# Patient Record
Sex: MALE | Race: WHITE | NOT HISPANIC OR LATINO | Employment: OTHER | URBAN - METROPOLITAN AREA
[De-identification: names, ages, dates, MRNs, and addresses within clinical notes are randomized per-mention and may not be internally consistent; named-entity substitution may affect disease eponyms.]

---

## 2017-05-03 ENCOUNTER — IMPORTED ENCOUNTER (OUTPATIENT)
Dept: URBAN - METROPOLITAN AREA CLINIC 31 | Facility: CLINIC | Age: 77
End: 2017-05-03

## 2017-05-03 PROBLEM — H26.492: Noted: 2017-05-03

## 2017-05-03 PROBLEM — H26.491: Noted: 2017-05-03

## 2017-05-03 PROBLEM — Z96.1: Noted: 2017-05-03

## 2017-05-03 PROCEDURE — 92014 COMPRE OPH EXAM EST PT 1/>: CPT

## 2017-05-31 ENCOUNTER — IMPORTED ENCOUNTER (OUTPATIENT)
Dept: URBAN - METROPOLITAN AREA CLINIC 31 | Facility: CLINIC | Age: 77
End: 2017-05-31

## 2017-05-31 PROBLEM — Z96.1: Noted: 2017-05-31

## 2017-05-31 PROCEDURE — 99024 POSTOP FOLLOW-UP VISIT: CPT

## 2017-11-29 ENCOUNTER — IMPORTED ENCOUNTER (OUTPATIENT)
Dept: URBAN - METROPOLITAN AREA CLINIC 31 | Facility: CLINIC | Age: 77
End: 2017-11-29

## 2017-11-29 PROBLEM — H26.491: Noted: 2017-11-29

## 2017-11-29 PROBLEM — Z96.1: Noted: 2017-11-29

## 2017-11-29 PROCEDURE — 92014 COMPRE OPH EXAM EST PT 1/>: CPT

## 2017-11-29 NOTE — PATIENT DISCUSSION
(H40.013) Open angle with borderline findings, low risk, bilateral - Assesment : Examination revealed mild suspicion for Open Angle Glaucoma. OCT [facility] [place] today. - Plan : Monitor for IOP and NFL changes with visits and testing. RTC in 1 year for Exam, Mac OCT, and OCT ONH, sooner if problems or changes.

## 2017-11-29 NOTE — PATIENT DISCUSSION
(H53.2) Diplopia - Assesment : Pt complains of intermittent diplopia. Diplopia not present at exam today. - Plan : Pt to call with increased diplopia or concerns.

## 2017-11-29 NOTE — PATIENT DISCUSSION
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 Mac OCT today. - Plan : Monitor for macular changes with visits and testing. Advised patient to call our office with decreased vision or increased symptoms. RTC in 1 year for Exam, Mac OCT, and OCT ONH, sooner if problems or changes.

## 2017-11-29 NOTE — PATIENT DISCUSSION
(H25.12) Age-related nuclear cataract, left eye - Assesment : Examination revealed cataract. Pt is bothered and symptomatic. - Plan : Monitor for changes. Advised patient of condition of cataract OS and option of CE OS to improve visual function OS. Pt prefers to wait at this time. Pt to call our office with decreased vision, increased symptoms, or decides would like to proceed with extraction before next visit.

## 2018-01-05 ENCOUNTER — IMPORTED ENCOUNTER (OUTPATIENT)
Dept: URBAN - METROPOLITAN AREA CLINIC 31 | Facility: CLINIC | Age: 78
End: 2018-01-05

## 2018-01-05 PROBLEM — Z98.89: Noted: 2018-01-05

## 2018-01-05 PROCEDURE — 99024 POSTOP FOLLOW-UP VISIT: CPT

## 2019-11-05 ENCOUNTER — NEW PATIENT (OUTPATIENT)
Dept: URBAN - METROPOLITAN AREA CLINIC 26 | Facility: CLINIC | Age: 79
End: 2019-11-05

## 2019-11-05 VITALS
SYSTOLIC BLOOD PRESSURE: 153 MMHG | DIASTOLIC BLOOD PRESSURE: 93 MMHG | BODY MASS INDEX: 33.75 KG/M2 | HEIGHT: 66 IN | WEIGHT: 210 LBS | HEART RATE: 87 BPM

## 2019-11-05 DIAGNOSIS — D31.31: ICD-10-CM

## 2019-11-05 DIAGNOSIS — G45.3: ICD-10-CM

## 2019-11-05 DIAGNOSIS — H02.836: ICD-10-CM

## 2019-11-05 DIAGNOSIS — H02.833: ICD-10-CM

## 2019-11-05 DIAGNOSIS — H04.123: ICD-10-CM

## 2019-11-05 DIAGNOSIS — H35.3131: ICD-10-CM

## 2019-11-05 DIAGNOSIS — D31.32: ICD-10-CM

## 2019-11-05 PROCEDURE — 99204 OFFICE O/P NEW MOD 45 MIN: CPT

## 2019-11-05 PROCEDURE — 92225 OPHTHALMOSCOPY (INITIAL): CPT

## 2019-11-05 PROCEDURE — 92250 FUNDUS PHOTOGRAPHY W/I&R: CPT

## 2019-11-05 PROCEDURE — 92235 FLUORESCEIN ANGRPH MLTIFRAME: CPT

## 2019-11-05 PROCEDURE — 92134 CPTRZ OPH DX IMG PST SGM RTA: CPT

## 2019-11-05 ASSESSMENT — VISUAL ACUITY
OD_CC: 20/30-2
OD_SC: 20/40-1
OS_CC: 20/25+2
OS_SC: 20/30-2

## 2019-11-05 ASSESSMENT — TONOMETRY
OD_IOP_MMHG: 12
OS_IOP_MMHG: 11

## 2020-01-14 ENCOUNTER — FOLLOW UP (OUTPATIENT)
Dept: URBAN - METROPOLITAN AREA CLINIC 26 | Facility: CLINIC | Age: 80
End: 2020-01-14

## 2020-01-14 VITALS — BODY MASS INDEX: 32.14 KG/M2 | WEIGHT: 200 LBS | HEIGHT: 66 IN

## 2020-01-14 DIAGNOSIS — H02.833: ICD-10-CM

## 2020-01-14 DIAGNOSIS — G45.3: ICD-10-CM

## 2020-01-14 DIAGNOSIS — D31.31: ICD-10-CM

## 2020-01-14 DIAGNOSIS — H35.3131: ICD-10-CM

## 2020-01-14 DIAGNOSIS — D31.32: ICD-10-CM

## 2020-01-14 DIAGNOSIS — H02.836: ICD-10-CM

## 2020-01-14 DIAGNOSIS — H04.123: ICD-10-CM

## 2020-01-14 PROCEDURE — 92250 FUNDUS PHOTOGRAPHY W/I&R: CPT

## 2020-01-14 PROCEDURE — 92014 COMPRE OPH EXAM EST PT 1/>: CPT

## 2020-01-14 PROCEDURE — 92134 CPTRZ OPH DX IMG PST SGM RTA: CPT

## 2020-01-14 ASSESSMENT — TONOMETRY
OS_IOP_MMHG: 13
OD_IOP_MMHG: 15

## 2020-01-14 ASSESSMENT — VISUAL ACUITY
OD_CC: 20/40+1
OS_CC: 20/20

## 2020-01-30 ENCOUNTER — ADDENDUM (OUTPATIENT)
Dept: URBAN - METROPOLITAN AREA CLINIC 26 | Facility: CLINIC | Age: 80
End: 2020-01-30

## 2020-02-20 ENCOUNTER — ADDENDUM (OUTPATIENT)
Dept: URBAN - METROPOLITAN AREA CLINIC 26 | Facility: CLINIC | Age: 80
End: 2020-02-20

## 2020-09-01 ENCOUNTER — FOLLOW UP (OUTPATIENT)
Dept: URBAN - METROPOLITAN AREA CLINIC 26 | Facility: CLINIC | Age: 80
End: 2020-09-01

## 2020-09-01 DIAGNOSIS — H02.833: ICD-10-CM

## 2020-09-01 DIAGNOSIS — D31.31: ICD-10-CM

## 2020-09-01 DIAGNOSIS — H04.123: ICD-10-CM

## 2020-09-01 DIAGNOSIS — D31.32: ICD-10-CM

## 2020-09-01 DIAGNOSIS — H43.821: ICD-10-CM

## 2020-09-01 DIAGNOSIS — H02.836: ICD-10-CM

## 2020-09-01 DIAGNOSIS — H35.3131: ICD-10-CM

## 2020-09-01 DIAGNOSIS — G45.3: ICD-10-CM

## 2020-09-01 PROCEDURE — 92134 CPTRZ OPH DX IMG PST SGM RTA: CPT

## 2020-09-01 PROCEDURE — 92014 COMPRE OPH EXAM EST PT 1/>: CPT

## 2020-09-01 PROCEDURE — 92250 FUNDUS PHOTOGRAPHY W/I&R: CPT

## 2020-09-01 ASSESSMENT — VISUAL ACUITY
OS_CC: 20/25+2
OD_CC: 20/25

## 2020-09-01 ASSESSMENT — TONOMETRY
OD_IOP_MMHG: 16
OS_IOP_MMHG: 12

## 2021-04-19 ENCOUNTER — FOLLOW UP (OUTPATIENT)
Dept: URBAN - METROPOLITAN AREA CLINIC 26 | Facility: CLINIC | Age: 81
End: 2021-04-19

## 2021-04-19 VITALS — BODY MASS INDEX: 34.55 KG/M2 | WEIGHT: 215 LBS | HEIGHT: 66 IN

## 2021-04-19 DIAGNOSIS — D31.31: ICD-10-CM

## 2021-04-19 DIAGNOSIS — H43.821: ICD-10-CM

## 2021-04-19 DIAGNOSIS — G45.3: ICD-10-CM

## 2021-04-19 DIAGNOSIS — H35.3131: ICD-10-CM

## 2021-04-19 DIAGNOSIS — D31.32: ICD-10-CM

## 2021-04-19 PROCEDURE — 92134 CPTRZ OPH DX IMG PST SGM RTA: CPT

## 2021-04-19 PROCEDURE — 92014 COMPRE OPH EXAM EST PT 1/>: CPT

## 2021-04-19 PROCEDURE — 92250 FUNDUS PHOTOGRAPHY W/I&R: CPT

## 2021-04-19 ASSESSMENT — VISUAL ACUITY
OS_CC: 20/25-1
OD_CC: 20/50-2
OD_PH: 20/20-2

## 2021-04-19 ASSESSMENT — TONOMETRY
OD_IOP_MMHG: 14
OS_IOP_MMHG: 15

## 2021-08-23 ENCOUNTER — UNSCHEDULED FOLLOW UP (OUTPATIENT)
Dept: URBAN - METROPOLITAN AREA CLINIC 26 | Facility: CLINIC | Age: 81
End: 2021-08-23

## 2021-08-23 DIAGNOSIS — H04.123: ICD-10-CM

## 2021-08-23 DIAGNOSIS — D31.31: ICD-10-CM

## 2021-08-23 DIAGNOSIS — H43.821: ICD-10-CM

## 2021-08-23 DIAGNOSIS — D31.32: ICD-10-CM

## 2021-08-23 DIAGNOSIS — G45.3: ICD-10-CM

## 2021-08-23 DIAGNOSIS — H35.3131: ICD-10-CM

## 2021-08-23 DIAGNOSIS — H43.812: ICD-10-CM

## 2021-08-23 PROCEDURE — 92134 CPTRZ OPH DX IMG PST SGM RTA: CPT

## 2021-08-23 PROCEDURE — 92014 COMPRE OPH EXAM EST PT 1/>: CPT

## 2021-08-23 PROCEDURE — 92250 FUNDUS PHOTOGRAPHY W/I&R: CPT

## 2021-08-23 ASSESSMENT — VISUAL ACUITY
OS_CC: 20/30+1
OS_PH: 20/25+2
OD_CC: 20/30
OD_PH: 20/25+2

## 2021-08-23 ASSESSMENT — TONOMETRY
OD_IOP_MMHG: 15
OS_IOP_MMHG: 13

## 2021-10-04 ENCOUNTER — FOLLOW UP (OUTPATIENT)
Dept: URBAN - METROPOLITAN AREA CLINIC 26 | Facility: CLINIC | Age: 81
End: 2021-10-04

## 2021-10-04 DIAGNOSIS — G45.3: ICD-10-CM

## 2021-10-04 DIAGNOSIS — H35.3131: ICD-10-CM

## 2021-10-04 DIAGNOSIS — H43.812: ICD-10-CM

## 2021-10-04 DIAGNOSIS — H04.123: ICD-10-CM

## 2021-10-04 DIAGNOSIS — D31.31: ICD-10-CM

## 2021-10-04 DIAGNOSIS — D31.32: ICD-10-CM

## 2021-10-04 DIAGNOSIS — H43.821: ICD-10-CM

## 2021-10-04 PROCEDURE — 92134 CPTRZ OPH DX IMG PST SGM RTA: CPT

## 2021-10-04 PROCEDURE — 92014 COMPRE OPH EXAM EST PT 1/>: CPT

## 2021-10-04 PROCEDURE — 92250 FUNDUS PHOTOGRAPHY W/I&R: CPT

## 2021-10-04 ASSESSMENT — TONOMETRY
OS_IOP_MMHG: 13
OD_IOP_MMHG: 16

## 2021-10-04 ASSESSMENT — VISUAL ACUITY
OS_SC: 20/20-1
OD_SC: 20/30+1

## 2022-04-02 ASSESSMENT — VISUAL ACUITY
OU_SC: 20/40
OS_GLARE: 20/40
OU_CC: 20/20
OS_CC: 20/30-2
OS_CC: 20/25
OS_CC: 20/20
OD_GLARE: 20/40-2
OS_CC: 20/25
OD_GLARE: 20/50-1MED
OD_CC: 20/60-1
OD_CC: 20/25-1
OU_SC: 20/50
OD_CC: 20/40

## 2022-04-02 ASSESSMENT — TONOMETRY
OS_IOP_MMHG: 13
OD_IOP_MMHG: 14
OS_IOP_MMHG: 13
OS_IOP_MMHG: 14
OD_IOP_MMHG: 13

## 2022-04-06 ENCOUNTER — FOLLOW UP (OUTPATIENT)
Dept: URBAN - METROPOLITAN AREA CLINIC 26 | Facility: CLINIC | Age: 82
End: 2022-04-06

## 2022-04-06 VITALS — WEIGHT: 195 LBS | BODY MASS INDEX: 31.34 KG/M2 | HEIGHT: 66 IN

## 2022-04-06 DIAGNOSIS — H35.3131: ICD-10-CM

## 2022-04-06 DIAGNOSIS — H04.123: ICD-10-CM

## 2022-04-06 DIAGNOSIS — D31.32: ICD-10-CM

## 2022-04-06 DIAGNOSIS — H43.821: ICD-10-CM

## 2022-04-06 DIAGNOSIS — D31.31: ICD-10-CM

## 2022-04-06 DIAGNOSIS — G45.3: ICD-10-CM

## 2022-04-06 DIAGNOSIS — H43.813: ICD-10-CM

## 2022-04-06 PROCEDURE — 92014 COMPRE OPH EXAM EST PT 1/>: CPT

## 2022-04-06 PROCEDURE — 92250 FUNDUS PHOTOGRAPHY W/I&R: CPT

## 2022-04-06 PROCEDURE — 92134 CPTRZ OPH DX IMG PST SGM RTA: CPT

## 2022-04-06 ASSESSMENT — TONOMETRY
OD_IOP_MMHG: 16
OS_IOP_MMHG: 16

## 2022-04-06 ASSESSMENT — VISUAL ACUITY
OS_CC: 20/20
OD_CC: 20/30+1

## 2022-10-07 ENCOUNTER — FOLLOW UP (OUTPATIENT)
Dept: URBAN - METROPOLITAN AREA CLINIC 26 | Facility: CLINIC | Age: 82
End: 2022-10-07

## 2022-10-07 VITALS — BODY MASS INDEX: 31.66 KG/M2 | WEIGHT: 197 LBS | HEIGHT: 66 IN

## 2022-10-07 DIAGNOSIS — D31.32: ICD-10-CM

## 2022-10-07 DIAGNOSIS — H35.3131: ICD-10-CM

## 2022-10-07 DIAGNOSIS — D31.31: ICD-10-CM

## 2022-10-07 DIAGNOSIS — H04.123: ICD-10-CM

## 2022-10-07 DIAGNOSIS — H43.821: ICD-10-CM

## 2022-10-07 DIAGNOSIS — H43.813: ICD-10-CM

## 2022-10-07 DIAGNOSIS — G45.3: ICD-10-CM

## 2022-10-07 PROCEDURE — 92014 COMPRE OPH EXAM EST PT 1/>: CPT

## 2022-10-07 PROCEDURE — 92250 FUNDUS PHOTOGRAPHY W/I&R: CPT

## 2022-10-07 PROCEDURE — 92134 CPTRZ OPH DX IMG PST SGM RTA: CPT

## 2022-10-07 ASSESSMENT — TONOMETRY
OS_IOP_MMHG: 18
OD_IOP_MMHG: 19

## 2022-10-07 ASSESSMENT — VISUAL ACUITY
OS_CC: 20/20-1
OD_CC: 20/40+1
OD_PH: 20/30

## 2023-04-07 ENCOUNTER — FOLLOW UP (OUTPATIENT)
Dept: URBAN - METROPOLITAN AREA CLINIC 26 | Facility: CLINIC | Age: 83
End: 2023-04-07

## 2023-04-07 VITALS — HEIGHT: 66 IN | BODY MASS INDEX: 31.34 KG/M2 | WEIGHT: 195 LBS

## 2023-04-07 DIAGNOSIS — H43.813: ICD-10-CM

## 2023-04-07 DIAGNOSIS — H04.123: ICD-10-CM

## 2023-04-07 DIAGNOSIS — D31.32: ICD-10-CM

## 2023-04-07 DIAGNOSIS — H35.3131: ICD-10-CM

## 2023-04-07 DIAGNOSIS — D31.31: ICD-10-CM

## 2023-04-07 DIAGNOSIS — G45.3: ICD-10-CM

## 2023-04-07 DIAGNOSIS — H43.821: ICD-10-CM

## 2023-04-07 PROCEDURE — 92134 CPTRZ OPH DX IMG PST SGM RTA: CPT

## 2023-04-07 PROCEDURE — 92014 COMPRE OPH EXAM EST PT 1/>: CPT

## 2023-04-07 PROCEDURE — 92250 FUNDUS PHOTOGRAPHY W/I&R: CPT

## 2023-04-07 ASSESSMENT — VISUAL ACUITY
OD_PH: 20/25-2
OS_SC: 20/20-1
OD_SC: 20/50-2

## 2023-04-07 ASSESSMENT — TONOMETRY
OD_IOP_MMHG: 16
OS_IOP_MMHG: 15

## 2023-10-04 ENCOUNTER — FOLLOW UP (OUTPATIENT)
Dept: URBAN - METROPOLITAN AREA CLINIC 26 | Facility: CLINIC | Age: 83
End: 2023-10-04

## 2023-10-04 DIAGNOSIS — H43.813: ICD-10-CM

## 2023-10-04 DIAGNOSIS — H43.821: ICD-10-CM

## 2023-10-04 DIAGNOSIS — H04.123: ICD-10-CM

## 2023-10-04 DIAGNOSIS — H35.3131: ICD-10-CM

## 2023-10-04 DIAGNOSIS — G45.3: ICD-10-CM

## 2023-10-04 DIAGNOSIS — D31.32: ICD-10-CM

## 2023-10-04 DIAGNOSIS — D31.31: ICD-10-CM

## 2023-10-04 PROCEDURE — 92250 FUNDUS PHOTOGRAPHY W/I&R: CPT | Mod: 59

## 2023-10-04 PROCEDURE — 92134 CPTRZ OPH DX IMG PST SGM RTA: CPT

## 2023-10-04 PROCEDURE — 92014 COMPRE OPH EXAM EST PT 1/>: CPT

## 2023-10-04 ASSESSMENT — TONOMETRY
OS_IOP_MMHG: 9
OD_IOP_MMHG: 6

## 2023-10-04 ASSESSMENT — VISUAL ACUITY
OS_CC: 20/20-2
OD_CC: 20/70-1
OD_PH: 20/30-1

## 2024-04-16 ENCOUNTER — COMPREHENSIVE EXAM (OUTPATIENT)
Dept: URBAN - METROPOLITAN AREA CLINIC 26 | Facility: CLINIC | Age: 84
End: 2024-04-16

## 2024-04-16 DIAGNOSIS — G45.3: ICD-10-CM

## 2024-04-16 DIAGNOSIS — H02.833: ICD-10-CM

## 2024-04-16 DIAGNOSIS — D31.31: ICD-10-CM

## 2024-04-16 DIAGNOSIS — H35.3131: ICD-10-CM

## 2024-04-16 DIAGNOSIS — H04.123: ICD-10-CM

## 2024-04-16 DIAGNOSIS — H43.821: ICD-10-CM

## 2024-04-16 DIAGNOSIS — H02.836: ICD-10-CM

## 2024-04-16 DIAGNOSIS — H43.813: ICD-10-CM

## 2024-04-16 DIAGNOSIS — D31.32: ICD-10-CM

## 2024-04-16 PROCEDURE — 92014 COMPRE OPH EXAM EST PT 1/>: CPT

## 2024-04-16 PROCEDURE — 92134 CPTRZ OPH DX IMG PST SGM RTA: CPT

## 2024-04-16 PROCEDURE — 92250 FUNDUS PHOTOGRAPHY W/I&R: CPT | Mod: 59

## 2024-04-16 ASSESSMENT — VISUAL ACUITY
OS_CC: 20/20-1
OD_CC: 20/30+1

## 2024-04-16 ASSESSMENT — TONOMETRY
OD_IOP_MMHG: 14
OS_IOP_MMHG: 14

## 2024-10-16 ENCOUNTER — COMPREHENSIVE EXAM (OUTPATIENT)
Dept: URBAN - METROPOLITAN AREA CLINIC 26 | Facility: CLINIC | Age: 84
End: 2024-10-16

## 2024-10-16 VITALS
BODY MASS INDEX: 27.09 KG/M2 | SYSTOLIC BLOOD PRESSURE: 144 MMHG | WEIGHT: 200 LBS | HEIGHT: 72 IN | DIASTOLIC BLOOD PRESSURE: 90 MMHG | HEART RATE: 77 BPM

## 2024-10-16 DIAGNOSIS — H43.813: ICD-10-CM

## 2024-10-16 DIAGNOSIS — D31.32: ICD-10-CM

## 2024-10-16 DIAGNOSIS — D31.31: ICD-10-CM

## 2024-10-16 DIAGNOSIS — H43.821: ICD-10-CM

## 2024-10-16 DIAGNOSIS — H02.833: ICD-10-CM

## 2024-10-16 DIAGNOSIS — H04.123: ICD-10-CM

## 2024-10-16 DIAGNOSIS — H02.836: ICD-10-CM

## 2024-10-16 DIAGNOSIS — G45.3: ICD-10-CM

## 2024-10-16 DIAGNOSIS — H35.3131: ICD-10-CM

## 2024-10-16 PROCEDURE — 92250 FUNDUS PHOTOGRAPHY W/I&R: CPT | Mod: 59

## 2024-10-16 PROCEDURE — 92134 CPTRZ OPH DX IMG PST SGM RTA: CPT

## 2024-10-16 PROCEDURE — 92014 COMPRE OPH EXAM EST PT 1/>: CPT

## 2025-03-05 ENCOUNTER — COMPREHENSIVE EXAM (OUTPATIENT)
Age: 85
End: 2025-03-05

## 2025-03-05 VITALS — BODY MASS INDEX: 32.14 KG/M2 | HEIGHT: 66 IN | WEIGHT: 200 LBS

## 2025-03-05 DIAGNOSIS — H35.3131: ICD-10-CM

## 2025-03-05 DIAGNOSIS — H43.813: ICD-10-CM

## 2025-03-05 DIAGNOSIS — D31.31: ICD-10-CM

## 2025-03-05 DIAGNOSIS — H04.123: ICD-10-CM

## 2025-03-05 DIAGNOSIS — D31.32: ICD-10-CM

## 2025-03-05 DIAGNOSIS — H43.821: ICD-10-CM

## 2025-03-05 DIAGNOSIS — G45.3: ICD-10-CM

## 2025-03-05 PROCEDURE — 92014 COMPRE OPH EXAM EST PT 1/>: CPT

## 2025-03-05 PROCEDURE — 92250 FUNDUS PHOTOGRAPHY W/I&R: CPT | Mod: 59

## 2025-03-05 PROCEDURE — 92134 CPTRZ OPH DX IMG PST SGM RTA: CPT
